# Patient Record
Sex: FEMALE | Race: WHITE | NOT HISPANIC OR LATINO | Employment: UNEMPLOYED | URBAN - METROPOLITAN AREA
[De-identification: names, ages, dates, MRNs, and addresses within clinical notes are randomized per-mention and may not be internally consistent; named-entity substitution may affect disease eponyms.]

---

## 2020-01-29 ENCOUNTER — HOSPITAL ENCOUNTER (EMERGENCY)
Facility: HOSPITAL | Age: 35
Discharge: HOME | End: 2020-01-29
Attending: EMERGENCY MEDICINE
Payer: COMMERCIAL

## 2020-01-29 VITALS
BODY MASS INDEX: 28.88 KG/M2 | HEIGHT: 67 IN | RESPIRATION RATE: 18 BRPM | DIASTOLIC BLOOD PRESSURE: 57 MMHG | SYSTOLIC BLOOD PRESSURE: 108 MMHG | OXYGEN SATURATION: 96 % | WEIGHT: 184 LBS | HEART RATE: 91 BPM | TEMPERATURE: 98 F

## 2020-01-29 DIAGNOSIS — R05.9 COUGH: ICD-10-CM

## 2020-01-29 DIAGNOSIS — N39.0 URINARY TRACT INFECTION WITHOUT HEMATURIA, SITE UNSPECIFIED: Primary | ICD-10-CM

## 2020-01-29 LAB
B-HCG UR QL: NEGATIVE
BACTERIA #/AREA URNS HPF: ABNORMAL /HPF
BILIRUB UR QL STRIP: NEGATIVE
CLARITY UR: ABNORMAL
COLOR UR: YELLOW
CTP QC/QA: YES
GLUCOSE UR QL STRIP: NEGATIVE
HGB UR QL STRIP: ABNORMAL
HYALINE CASTS #/AREA URNS LPF: 17 /LPF
INFLUENZA A, MOLECULAR: NEGATIVE
INFLUENZA B, MOLECULAR: NEGATIVE
KETONES UR QL STRIP: ABNORMAL
LEUKOCYTE ESTERASE UR QL STRIP: ABNORMAL
MICROSCOPIC COMMENT: ABNORMAL
NITRITE UR QL STRIP: NEGATIVE
PH UR STRIP: 6 [PH] (ref 5–8)
PROT UR QL STRIP: ABNORMAL
RBC #/AREA URNS HPF: 8 /HPF (ref 0–4)
SP GR UR STRIP: 1.02 (ref 1–1.03)
SPECIMEN SOURCE: NORMAL
SQUAMOUS #/AREA URNS HPF: 9 /HPF
URN SPEC COLLECT METH UR: ABNORMAL
UROBILINOGEN UR STRIP-ACNC: ABNORMAL EU/DL
WBC #/AREA URNS HPF: >100 /HPF (ref 0–5)

## 2020-01-29 PROCEDURE — 25000003 PHARM REV CODE 250: Performed by: EMERGENCY MEDICINE

## 2020-01-29 PROCEDURE — 81001 URINALYSIS AUTO W/SCOPE: CPT

## 2020-01-29 PROCEDURE — 87086 URINE CULTURE/COLONY COUNT: CPT

## 2020-01-29 PROCEDURE — 87077 CULTURE AEROBIC IDENTIFY: CPT

## 2020-01-29 PROCEDURE — 87186 SC STD MICRODIL/AGAR DIL: CPT

## 2020-01-29 PROCEDURE — 81025 URINE PREGNANCY TEST: CPT | Performed by: EMERGENCY MEDICINE

## 2020-01-29 PROCEDURE — 99284 EMERGENCY DEPT VISIT MOD MDM: CPT | Mod: 25

## 2020-01-29 PROCEDURE — 87502 INFLUENZA DNA AMP PROBE: CPT

## 2020-01-29 RX ORDER — CEPHALEXIN 250 MG/1
500 CAPSULE ORAL
Status: COMPLETED | OUTPATIENT
Start: 2020-01-29 | End: 2020-01-29

## 2020-01-29 RX ORDER — HYDROCODONE BITARTRATE AND ACETAMINOPHEN 10; 325 MG/1; MG/1
1 TABLET ORAL
COMMUNITY

## 2020-01-29 RX ORDER — MULTIVITAMIN
1 TABLET ORAL DAILY
COMMUNITY

## 2020-01-29 RX ORDER — ONDANSETRON 2 MG/ML
4 INJECTION INTRAMUSCULAR; INTRAVENOUS
Status: DISCONTINUED | OUTPATIENT
Start: 2020-01-29 | End: 2020-01-29

## 2020-01-29 RX ORDER — CYCLOBENZAPRINE HCL 10 MG
10 TABLET ORAL 3 TIMES DAILY PRN
COMMUNITY

## 2020-01-29 RX ORDER — PNV NO.95/FERROUS FUM/FOLIC AC 28MG-0.8MG
100 TABLET ORAL DAILY
COMMUNITY

## 2020-01-29 RX ORDER — CEPHALEXIN 500 MG/1
500 CAPSULE ORAL 4 TIMES DAILY
Qty: 40 CAPSULE | Refills: 0 | Status: SHIPPED | OUTPATIENT
Start: 2020-01-29 | End: 2020-02-08

## 2020-01-29 RX ORDER — OSELTAMIVIR PHOSPHATE 75 MG/1
75 CAPSULE ORAL 2 TIMES DAILY
Qty: 10 CAPSULE | Refills: 0 | Status: SHIPPED | OUTPATIENT
Start: 2020-01-29 | End: 2020-02-03

## 2020-01-29 RX ORDER — ONDANSETRON 4 MG/1
4 TABLET, ORALLY DISINTEGRATING ORAL
Status: COMPLETED | OUTPATIENT
Start: 2020-01-29 | End: 2020-01-29

## 2020-01-29 RX ADMIN — ONDANSETRON 4 MG: 4 TABLET, ORALLY DISINTEGRATING ORAL at 09:01

## 2020-01-29 RX ADMIN — CEPHALEXIN 500 MG: 250 CAPSULE ORAL at 11:01

## 2020-01-30 NOTE — ED PROVIDER NOTES
Encounter Date: 1/29/2020       History     Chief Complaint   Patient presents with    Fever     Chief complaint is flu-like symptoms with body aches sore throat mild headache fever to 102.5 had cold-like symptoms for 2 weeks.  The patient had a fever of 102.5 on Wednesday.  Last Tylenol dose 4:00 p.m..  She denies any complaints otherwise.  Vital signs here pulse 117 blood pressure 130/56 respirations 20 temperature 98.6°.        Review of patient's allergies indicates:  No Known Allergies  No past medical history on file.  No past surgical history on file.  No family history on file.  Social History     Tobacco Use    Smoking status: Not on file   Substance Use Topics    Alcohol use: Not on file    Drug use: Not on file     Review of Systems   Constitutional: Positive for fever. Negative for chills.   HENT: Positive for sore throat. Negative for ear pain and rhinorrhea.    Eyes: Negative for pain and visual disturbance.   Respiratory: Positive for cough. Negative for shortness of breath.    Cardiovascular: Negative for chest pain and palpitations.   Gastrointestinal: Negative for abdominal pain, constipation, diarrhea, nausea and vomiting.   Genitourinary: Negative for dysuria, frequency, hematuria and urgency.   Musculoskeletal: Positive for arthralgias. Negative for back pain, joint swelling and myalgias.   Skin: Negative for rash.   Neurological: Positive for headaches. Negative for dizziness, seizures and weakness.   Psychiatric/Behavioral: Negative for dysphoric mood. The patient is not nervous/anxious.        Physical Exam     Initial Vitals [01/29/20 1803]   BP Pulse Resp Temp SpO2   (!) 130/56 (!) 117 20 98.6 °F (37 °C) 96 %      MAP       --         Physical Exam    Nursing note and vitals reviewed.  Constitutional: She appears well-developed and well-nourished.   HENT:   Head: Normocephalic and atraumatic.   Eyes: Conjunctivae, EOM and lids are normal. Pupils are equal, round, and reactive to light.    Neck: Trachea normal and normal range of motion. Neck supple. No thyroid mass and no thyromegaly present.   Cardiovascular: Normal rate, regular rhythm and normal heart sounds.   Pulmonary/Chest: Effort normal and breath sounds normal.   Abdominal: Soft. Normal appearance and bowel sounds are normal. There is no tenderness.   Minimal tenderness right mid quadrant and suprapubic area no rebound negative obturator negative iliopsoas test.  Tapping heels causes no pain. Negative Dunphy test.    Musculoskeletal: Normal range of motion.   Neurological: She is alert and oriented to person, place, and time. She has normal strength and normal reflexes. No cranial nerve deficit or sensory deficit.   Skin: Skin is warm and dry.   Psychiatric: She has a normal mood and affect. Her speech is normal and behavior is normal. Judgment and thought content normal.         ED Course   Procedures  Labs Reviewed   INFLUENZA A AND B ANTIGEN    Narrative:     Specimen Source->Nasopharyngeal Swab   URINALYSIS, REFLEX TO URINE CULTURE   CBC W/ AUTO DIFFERENTIAL   COMPREHENSIVE METABOLIC PANEL   AMYLASE   LIPASE   POCT URINE PREGNANCY          Imaging Results          X-Ray Chest PA And Lateral (Final result)  Result time 01/29/20 18:24:45    Final result by Edwin Benjamin MD (01/29/20 18:24:45)                 Impression:      No acute cardiac or pulmonary process.      Electronically signed by: Edwin Benjamin MD  Date:    01/29/2020  Time:    18:24             Narrative:    CLINICAL HISTORY:  ([id number] y/o  (1985) F    Cough    TECHNIQUE:  (A#[id number], exam time 1/29/2020 18:23)    XR CHEST PA AND LATERAL [id number]    COMPARISON:  None available.    FINDINGS:  The lungs are clear. Costophrenic angles are seen without effusion. No pneumothorax is identified. The heart is normal in size. The mediastinum is within normal limits. Osseous structures appear within normal limits. The visualized upper abdomen is unremarkable.                                               Attending Attestation:             Attending ED Notes:   The patient has UTI and will be discharged home with antibiotics and instruction.  She also has flu like symptoms and will be given Tamiflu          ED Course as of Jan 29 2107 Wed Jan 29, 2020 1952 34-year-old female presents emergency room with complaint of body aches chills fever for the last few days reports she felt like she may pass out this morning and she had 1 episode of vomiting.    [MP]      ED Course User Index  [MP] RUBIO Wilson                Clinical Impression:       ICD-10-CM ICD-9-CM   1. Urinary tract infection without hematuria, site unspecified N39.0 599.0   2. Cough R05 786.2                             Priscila Brito MD  01/29/20 7414

## 2020-02-01 LAB — BACTERIA UR CULT: ABNORMAL

## 2020-02-06 ENCOUNTER — OFFICE VISIT (OUTPATIENT)
Dept: PRIMARY CARE CLINIC | Facility: CLINIC | Age: 35
End: 2020-02-06
Payer: COMMERCIAL

## 2020-02-06 VITALS
OXYGEN SATURATION: 99 % | WEIGHT: 189.06 LBS | HEIGHT: 67 IN | SYSTOLIC BLOOD PRESSURE: 116 MMHG | HEART RATE: 96 BPM | RESPIRATION RATE: 18 BRPM | TEMPERATURE: 98 F | BODY MASS INDEX: 29.67 KG/M2 | DIASTOLIC BLOOD PRESSURE: 68 MMHG

## 2020-02-06 DIAGNOSIS — Z28.21 REFUSED INFLUENZA VACCINE: ICD-10-CM

## 2020-02-06 DIAGNOSIS — Z09 HOSPITAL DISCHARGE FOLLOW-UP: Primary | ICD-10-CM

## 2020-02-06 DIAGNOSIS — Z72.0 TOBACCO ABUSE: ICD-10-CM

## 2020-02-06 PROCEDURE — 99999 PR PBB SHADOW E&M-EST. PATIENT-LVL III: CPT | Mod: PBBFAC,,, | Performed by: FAMILY MEDICINE

## 2020-02-06 PROCEDURE — 99203 PR OFFICE/OUTPT VISIT, NEW, LEVL III, 30-44 MIN: ICD-10-PCS | Mod: S$GLB,,, | Performed by: FAMILY MEDICINE

## 2020-02-06 PROCEDURE — 99203 OFFICE O/P NEW LOW 30 MIN: CPT | Mod: S$GLB,,, | Performed by: FAMILY MEDICINE

## 2020-02-06 PROCEDURE — 3008F PR BODY MASS INDEX (BMI) DOCUMENTED: ICD-10-PCS | Mod: CPTII,S$GLB,, | Performed by: FAMILY MEDICINE

## 2020-02-06 PROCEDURE — 3008F BODY MASS INDEX DOCD: CPT | Mod: CPTII,S$GLB,, | Performed by: FAMILY MEDICINE

## 2020-02-06 PROCEDURE — 99999 PR PBB SHADOW E&M-EST. PATIENT-LVL III: ICD-10-PCS | Mod: PBBFAC,,, | Performed by: FAMILY MEDICINE

## 2020-02-06 RX ORDER — VARENICLINE TARTRATE 0.5 (11)-1
KIT ORAL
Qty: 1 PACKAGE | Refills: 0 | Status: SHIPPED | OUTPATIENT
Start: 2020-02-06 | End: 2020-05-28 | Stop reason: SDUPTHER

## 2020-02-06 NOTE — PROGRESS NOTES
"Subjective:       Patient ID: Yolette Malagon is a 34 y.o. female.    Chief Complaint: Hospital Follow Up (just needs a note stating ok to return to work)    34 yr old with hx of tobacco abuse here for follow up from the ER for a UTI and flu diagnosed on 1/29/2020. Needs a note for her to return to work. She still completing the keflex for the UTI and denies fevers or chills. Already finished tamiflu.   Previously seen by Dr. Snow but has not seen him in 3 yrs and unable to see him today due to availability.   Interested in smoking cessation meds, namely chantix which helped in the past. Willing to try again.      Review of Systems   Constitutional: Negative for activity change, chills and fever.   Respiratory: Negative for cough, chest tightness, shortness of breath and wheezing.    Cardiovascular: Negative for chest pain and leg swelling.   Gastrointestinal: Negative for abdominal distention, abdominal pain, constipation, diarrhea, nausea and vomiting.   Genitourinary: Negative for dysuria.   Neurological: Negative for weakness.   Hematological: Does not bruise/bleed easily.       Objective:      Vitals:    02/06/20 1425   BP: 116/68   BP Location: Right arm   Patient Position: Sitting   BP Method: Large (Manual)   Pulse: 96   Resp: 18   Temp: 98.2 °F (36.8 °C)   TempSrc: Oral   SpO2: 99%   Weight: 85.7 kg (189 lb 0.7 oz)   Height: 5' 7" (1.702 m)     Physical Exam   Constitutional: She appears well-developed and well-nourished.   HENT:   Head: Normocephalic and atraumatic.   Nose: Nose normal.   Mouth/Throat: Oropharynx is clear and moist.   Eyes: Conjunctivae and EOM are normal.   Cardiovascular: Normal rate, regular rhythm and normal heart sounds.   Pulmonary/Chest: Effort normal and breath sounds normal. No respiratory distress. She has no wheezes. She has no rales.   Abdominal: Soft. She exhibits no distension. There is no tenderness.   Lymphadenopathy:     She has no cervical adenopathy.   Neurological: " She is alert. No cranial nerve deficit.   Psychiatric: She has a normal mood and affect.   Nursing note and vitals reviewed.          No results found for: NA, K, CL, CO2, BUN, CREATININE, GLUCOSE, ANIONGAP  No results found for: HGBA1C  No results found for: BNP, BNPTRIAGEBLO    No results found for: WBC, HGB, HCT, PLT, GRAN  No results found for: CHOL, HDL, LDLCALC, TRIG       Current Outpatient Medications:     cephALEXin (KEFLEX) 500 MG capsule, Take 1 capsule (500 mg total) by mouth 4 (four) times daily. for 10 days, Disp: 40 capsule, Rfl: 0    cyanocobalamin (VITAMIN B-12) 100 MCG tablet, Take 100 mcg by mouth once daily., Disp: , Rfl:     cyclobenzaprine (FLEXERIL) 10 MG tablet, Take 10 mg by mouth 3 (three) times daily as needed for Muscle spasms., Disp: , Rfl:     HYDROcodone-acetaminophen (NORCO)  mg per tablet, Take 1 tablet by mouth., Disp: , Rfl:     multivitamin (THERAGRAN) per tablet, Take 1 tablet by mouth once daily., Disp: , Rfl:     varenicline (CHANTIX STARTING MONTH BOX) 0.5 mg (11)- 1 mg (42) tablet, Take one 0.5mg tab by mouth once daily X3 days,then increase to one 0.5mg tab twice daily X4 days,then increase to one 1mg tab twice daily, Disp: 1 Package, Rfl: 0        Assessment:       1. Hospital discharge follow-up    2. Tobacco abuse           Plan:       Hospital discharge follow-up  Follow for UTI and flu symptoms for which she was treated on 1/29/2020 with tamiflu and keflex(still finishing script). Asymptomatic and cleared to return to work.    Tobacco abuse  -     varenicline (CHANTIX STARTING MONTH BOX) 0.5 mg (11)- 1 mg (42) tablet; Take one 0.5mg tab by mouth once daily X3 days,then increase to one 0.5mg tab twice daily X4 days,then increase to one 1mg tab twice daily  Dispense: 1 Package; Refill: 0  Offered smoking cessation counseling today-not interested in class due to time constraints. Desiring chantix which was helpful before. Prescribed today.      Maintenance: Had  annual labs done with pain doctor recently. Requesting med records. To review health maintenance needs with PCP she is desiring to follow up with soon.

## 2020-02-06 NOTE — LETTER
February 6, 2020      [facility]  [address]  [facility] [address]  Phone: [phone]  Fax: [phone]       Patient: Yolette Malagon   YOB: 1985  Date of Visit: 02/06/2020    To Whom It May Concern:    Grace Malagon  was at [facility] on 02/06/2020. She may return to work on 2/8/20 without restrictions. If you have any questions or concerns, or if I can be of further assistance, please do not hesitate to contact me.    Sincerely,    Talya Johnson LPN

## 2020-05-28 ENCOUNTER — OFFICE VISIT (OUTPATIENT)
Dept: PRIMARY CARE CLINIC | Facility: CLINIC | Age: 35
End: 2020-05-28
Payer: COMMERCIAL

## 2020-05-28 VITALS
BODY MASS INDEX: 31.73 KG/M2 | HEIGHT: 67 IN | HEART RATE: 90 BPM | OXYGEN SATURATION: 98 % | DIASTOLIC BLOOD PRESSURE: 84 MMHG | SYSTOLIC BLOOD PRESSURE: 130 MMHG | RESPIRATION RATE: 18 BRPM | WEIGHT: 202.19 LBS

## 2020-05-28 DIAGNOSIS — R51.9 INTRACTABLE EPISODIC HEADACHE, UNSPECIFIED HEADACHE TYPE: ICD-10-CM

## 2020-05-28 DIAGNOSIS — M51.36 DDD (DEGENERATIVE DISC DISEASE), LUMBAR: ICD-10-CM

## 2020-05-28 DIAGNOSIS — E66.9 CLASS 1 OBESITY WITH BODY MASS INDEX (BMI) OF 31.0 TO 31.9 IN ADULT, UNSPECIFIED OBESITY TYPE, UNSPECIFIED WHETHER SERIOUS COMORBIDITY PRESENT: ICD-10-CM

## 2020-05-28 DIAGNOSIS — Z72.0 TOBACCO ABUSE: ICD-10-CM

## 2020-05-28 PROBLEM — M51.369 DDD (DEGENERATIVE DISC DISEASE), LUMBAR: Status: ACTIVE | Noted: 2020-05-28

## 2020-05-28 PROBLEM — E66.811 CLASS 1 OBESITY WITH BODY MASS INDEX (BMI) OF 31.0 TO 31.9 IN ADULT: Status: ACTIVE | Noted: 2020-05-28

## 2020-05-28 PROCEDURE — 99213 PR OFFICE/OUTPT VISIT, EST, LEVL III, 20-29 MIN: ICD-10-PCS | Mod: S$GLB,,, | Performed by: FAMILY MEDICINE

## 2020-05-28 PROCEDURE — 3008F BODY MASS INDEX DOCD: CPT | Mod: CPTII,S$GLB,, | Performed by: FAMILY MEDICINE

## 2020-05-28 PROCEDURE — 99999 PR PBB SHADOW E&M-EST. PATIENT-LVL III: ICD-10-PCS | Mod: PBBFAC,,, | Performed by: FAMILY MEDICINE

## 2020-05-28 PROCEDURE — 99213 OFFICE O/P EST LOW 20 MIN: CPT | Mod: S$GLB,,, | Performed by: FAMILY MEDICINE

## 2020-05-28 PROCEDURE — 3008F PR BODY MASS INDEX (BMI) DOCUMENTED: ICD-10-PCS | Mod: CPTII,S$GLB,, | Performed by: FAMILY MEDICINE

## 2020-05-28 PROCEDURE — 99999 PR PBB SHADOW E&M-EST. PATIENT-LVL III: CPT | Mod: PBBFAC,,, | Performed by: FAMILY MEDICINE

## 2020-05-28 RX ORDER — VARENICLINE TARTRATE 0.5 (11)-1
KIT ORAL
Qty: 1 PACKAGE | Refills: 0 | Status: SHIPPED | OUTPATIENT
Start: 2020-05-28 | End: 2020-05-28

## 2020-05-28 RX ORDER — BUTALBITAL, ACETAMINOPHEN AND CAFFEINE 50; 325; 40 MG/1; MG/1; MG/1
TABLET ORAL
Qty: 30 TABLET | Refills: 2 | Status: SHIPPED | OUTPATIENT
Start: 2020-05-28 | End: 2020-05-28

## 2020-05-28 RX ORDER — VARENICLINE TARTRATE 0.5 (11)-1
KIT ORAL
Qty: 1 PACKAGE | Refills: 0 | Status: SHIPPED | OUTPATIENT
Start: 2020-05-28 | End: 2023-10-03 | Stop reason: SDUPTHER

## 2020-05-28 RX ORDER — BUTALBITAL, ACETAMINOPHEN AND CAFFEINE 50; 325; 40 MG/1; MG/1; MG/1
TABLET ORAL
Qty: 30 TABLET | Refills: 2 | Status: SHIPPED | OUTPATIENT
Start: 2020-05-28 | End: 2022-12-12

## 2020-05-28 NOTE — PROGRESS NOTES
Dictation #1  MRN:[id number]  CSN:[id number]  Dictation #2  MRN:[id number]  CSN:[id number]    Subjective:       Patient ID: Yolette Malagon is a 35 y.o. female.    Chief Complaint: Nicotine Dependence (wants to quit) and Headache    HPI: 34 yo WF-- patient had quit smoking in the past with Chantix-- after the corona virus started restarted smoking wants to quit again        Headache left frontal and temporal area-- another 1 in the occipital area and base of the skull-- always had headaches off and on but recently had 5 headaches out of last 7 days---quality  Sharp stabbing throbbing--severity 9/10,  Duration 1st headache lasted 3 days last 1 1/2 days .  Photophobia- sensitivity to sound.    ROS:  Skin: no psoriasis, eczema, skin cancer  HEENT: + headache, ocular pain, blurred vision, diplopia, epistaxis, hoarseness change in voice, thyroid trouble  Wears contacts had an exam less than a year ago  Lung: No pneumonia, asthma, Tb, wheezing, SOB, smoking 1 pack per day  Heart: No chest pain, ankle edema, palpitations, MI, janine murmur, hypertension, hyperlipidemia  Abdomen: + slight  Nausea, no  vomiting, diarrhea, constipation, ulcers, hepatitis, gallbladder disease, melena, hematochezia, hematemesis  : no UTI, renal disease, stones  GYN LMP  Ended 5-  MS: no fractures, O/A, lupus, rheumatoid, gout  History of DDD lumbar spine  Neuro: No dizziness, LOC, seizures   No diabetes, no anemia, + anxiety-- virus, no depression    in a relationship but not  [place] 3 children,   [place] airport,  Lives with 3 children    Objective:   Physical Exam:  General: Well nourished, well developed, no acute distress  Skin: No lesions  HEENT: Eyes PERRLA, EOM intact, nose patent, throat non-erythematous   NECK: Supple, no bruits, No JVD, no nodes  Lungs: Clear, no rales, rhonchi, wheezing  Heart: Regular rate and rhythm, no murmurs, gallops, or rubs  Abdomen: flat, bowel sounds positive, no tenderness, or organomegaly  MS: Range  of motion and muscle strength intact -- tenderness lumbar spine pain with anterior flexion extension lateral flexion rotation straight leg lift no radiculopathy reflexes  intact  Neuro: Alert, CN intact, oriented X 3 Romberg negative heel to intact  Extremities: No cyanosis, clubbing, or edema         Assessment:       1. Tobacco abuse    2. Intractable episodic headache, unspecified headache type    3. DDD (degenerative disc disease), lumbar    4. Class 1 obesity with body mass index (BMI) of 31.0 to 31.9 in adult, unspecified obesity type, unspecified whether serious comorbidity present        Plan:       Tobacco abuse  -     Discontinue: varenicline (CHANTIX STARTING MONTH BOX) 0.5 mg (11)- 1 mg (42) tablet; Take one 0.5mg tab by mouth once daily X3 days,then increase to one 0.5mg tab twice daily X4 days,then increase to one 1mg tab twice daily  Dispense: 1 Package; Refill: 0  -     varenicline (CHANTIX STARTING MONTH BOX) 0.5 mg (11)- 1 mg (42) tablet; Take one 0.5mg tab by mouth once daily X3 days,then increase to one 0.5mg tab twice daily X4 days,then increase to one 1mg tab twice daily  Dispense: 1 Package; Refill: 0    Intractable episodic headache, unspecified headache type  -     CBC auto differential; Future; Expected date: 05/28/2020  -     Comprehensive metabolic panel; Future; Expected date: 05/28/2020  -     EKG 12-lead; Future; Expected date: 05/28/2020  -     Fecal Immunochemical Test (iFOBT); Future; Expected date: 05/28/2020  -     Lipid Panel; Future; Expected date: 05/28/2020  -     POCT urine dipstick without microscope  -     T4, free; Future; Expected date: 05/28/2020  -     TSH; Future; Expected date: 05/28/2020  -     MRI Brain (Tumor with Perfusion) W W/O Contrast (XPD); Future; Expected date: 05/28/2020    DDD (degenerative disc disease), lumbar    Class 1 obesity with body mass index (BMI) of 31.0 to 31.9 in adult, unspecified obesity type, unspecified whether serious comorbidity  present    Other orders  -     Discontinue: butalbital-acetaminophen-caffeine -40 mg (FIORICET, ESGIC) -40 mg per tablet; One p.o. Q.d. P.r.n. Headache if necessary can increase to b.i.d.  Dispense: 30 tablet; Refill: 2  -     butalbital-acetaminophen-caffeine -40 mg (FIORICET, ESGIC) -40 mg per tablet; One p.o. Q.d. P.r.n. Headache if necessary can increase to b.i.d.  Dispense: 30 tablet; Refill: 2       Tobacco abuse--- Chantix tapering dose has on prescription   headaches-- with photophobia-- sensitivity to sound-- lasting for days-- Fioricet 1 p.o. Q.h.s. P.r.n. Headache needs to do a headache diary-- write down quality of headache/ severity of headache/location of headache/ frequency of headache/duration of headache /associated symptom needs MRI of the brain with and without gadolinium may need neurological consult to fail to improve   Needs routine lab CBCs CMP lipids T4 TSH stool guaiac UA EKG had chest x-ray if symptoms day may need collagen vascular workup with sed rate BARRY rheumatoid factor RPR  HIV TB skin test  Health maintenanceI HV hepatitis Clipid  tetanus pneumococcal vaccine Pap smear

## 2020-07-15 ENCOUNTER — PATIENT OUTREACH (OUTPATIENT)
Dept: ADMINISTRATIVE | Facility: HOSPITAL | Age: 35
End: 2020-07-15

## 2020-07-15 NOTE — LETTER
AUTHORIZATION FOR RELEASE OF   CONFIDENTIAL INFORMATION    Dear Dr. Gallardo,    We are seeing Yolette Malagon, date of birth 1985, in the clinic at [facility]. Nayan Snow MD is the patient's PCP. Yolette Malagon has an outstanding lab/procedure at the time we reviewed her chart. In order to help keep her health information updated, she has authorized us to request the following medical record(s):        (  )  MAMMOGRAM                                      (  )  COLONOSCOPY      ( X )  PAP SMEAR                                          (  )  OUTSIDE LAB RESULTS     (  )  DEXA SCAN                                          (  )  EYE EXAM            (  )  FOOT EXAM                                          (  )  ENTIRE RECORD     (  )  OUTSIDE IMMUNIZATIONS                 (  )  _______________         Please fax records to [facility], Bryan J Bertucci, MD, [phone]     If you have any questions, please contact Gomez Benz LPN at [phone].           Patient Name: Yolette Malagon  : 1985  Patient Phone #: [phone]

## 2020-07-16 NOTE — PROGRESS NOTES
[facility], [facility], and [facility] reviewed.  PAP from 2015 uploaded to  per [facility].  Lipid Panel uploaded to  per [facility].   Patient sees Dr. Gallardo and TERESITA sent to obtain more recent records.   updated.

## 2020-11-03 ENCOUNTER — OFFICE VISIT (OUTPATIENT)
Dept: PRIMARY CARE CLINIC | Facility: CLINIC | Age: 35
End: 2020-11-03
Payer: MEDICAID

## 2020-11-03 VITALS
HEART RATE: 91 BPM | TEMPERATURE: 98 F | DIASTOLIC BLOOD PRESSURE: 62 MMHG | BODY MASS INDEX: 33.94 KG/M2 | SYSTOLIC BLOOD PRESSURE: 116 MMHG | HEIGHT: 67 IN | WEIGHT: 216.25 LBS | RESPIRATION RATE: 18 BRPM | OXYGEN SATURATION: 99 %

## 2020-11-03 DIAGNOSIS — R11.0 NAUSEA: ICD-10-CM

## 2020-11-03 DIAGNOSIS — Z72.0 TOBACCO ABUSE: ICD-10-CM

## 2020-11-03 DIAGNOSIS — M51.36 DDD (DEGENERATIVE DISC DISEASE), LUMBAR: ICD-10-CM

## 2020-11-03 DIAGNOSIS — Z11.59 NEED FOR HEPATITIS C SCREENING TEST: ICD-10-CM

## 2020-11-03 DIAGNOSIS — R51.9 INTRACTABLE EPISODIC HEADACHE, UNSPECIFIED HEADACHE TYPE: Primary | ICD-10-CM

## 2020-11-03 DIAGNOSIS — E66.9 CLASS 1 OBESITY WITH BODY MASS INDEX (BMI) OF 31.0 TO 31.9 IN ADULT, UNSPECIFIED OBESITY TYPE, UNSPECIFIED WHETHER SERIOUS COMORBIDITY PRESENT: ICD-10-CM

## 2020-11-03 PROCEDURE — 99214 OFFICE O/P EST MOD 30 MIN: CPT | Mod: S$PBB,,, | Performed by: FAMILY MEDICINE

## 2020-11-03 PROCEDURE — 99999 PR PBB SHADOW E&M-EST. PATIENT-LVL III: ICD-10-PCS | Mod: PBBFAC,,, | Performed by: FAMILY MEDICINE

## 2020-11-03 PROCEDURE — 99999 PR PBB SHADOW E&M-EST. PATIENT-LVL III: CPT | Mod: PBBFAC,,, | Performed by: FAMILY MEDICINE

## 2020-11-03 PROCEDURE — 99214 PR OFFICE/OUTPT VISIT, EST, LEVL IV, 30-39 MIN: ICD-10-PCS | Mod: S$PBB,,, | Performed by: FAMILY MEDICINE

## 2020-11-03 PROCEDURE — 99213 OFFICE O/P EST LOW 20 MIN: CPT | Mod: PBBFAC,PN | Performed by: FAMILY MEDICINE

## 2020-11-03 RX ORDER — BUPRENORPHINE HYDROCHLORIDE 300 UG/1
FILM, SOLUBLE BUCCAL
COMMUNITY
Start: 2020-10-24

## 2020-11-03 RX ORDER — LORAZEPAM 1 MG/1
TABLET ORAL
Qty: 3 TABLET | Refills: 0 | Status: SHIPPED | OUTPATIENT
Start: 2020-11-03 | End: 2022-12-12

## 2020-11-03 NOTE — PROGRESS NOTES
Dictation #1  MRN:[id number]  CSN:[id number]  Dictation #2  MRN:[id number]  CSN:[id number]    Subjective:       Patient ID: Yolette Malagon is a 35 y.o. female.    Chief Complaint: Annual Exam    HPI: 36 yo WF-- in for annual exam-- eating well--+BM--ambulating well.  History of headaches times years---usually in the frontal area occasionally on the right parietal area---frequency 5 in the past month--duration--half to 2 days--quality pulsating throbbing--- severity 8/10--has noise and light sensitivity    ROS:  Skin: no psoriasis, eczema, skin cancer  HEENT: + headache, ocular pain, blurred vision, diplopia, epistaxis, hoarseness change in voice, thyroid trouble   Lung: No pneumonia, asthma, Tb, wheezing, SOB, smoking 1 pack per day  Heart: No chest pain, ankle edema, palpitations, MI, janine murmur, hypertension, hyperlipidemia--no stent bypass arrhythmia  Abdomen: + slight  Nausea, no  vomiting, diarrhea, constipation, ulcers, hepatitis, gallbladder disease, melena, hematochezia, hematemesis  : no UTI, renal disease, stones  GYN LMP  10/05/2020  MS: no fractures, O/A, lupus, rheumatoid, gout  History of DDD lumbar spine  Neuro: No dizziness, LOC, seizures   No diabetes, no anemia, + anxiety-- virus, no depression    in a relationship but not --- 3 children,   in airport,  Lives with 3 children    Objective:   Physical Exam:  General: Well nourished, well developed, no acute distress  Skin: No lesions  HEENT: Eyes PERRLA, EOM intact, contact nose patent, throat non-erythematous ears TMs clear  NECK: Supple, no bruits, No JVD, no nodes  Lungs: Clear, no rales, rhonchi, wheezing  Heart: Regular rate and rhythm, no murmurs, gallops, or rubs  Abdomen: flat, bowel sounds positive, no tenderness, or organomegaly  MS: Range of motion and muscle strength intact -- tenderness lumbar spine pain with anterior flexion extension lateral flexion rotation straight leg lift no radiculopathy reflexes  intact  Neuro: Alert,  CN intact, oriented X 3 Romberg negative heel to intact  Extremities: No cyanosis, clubbing, or edema         Assessment:       1. Intractable episodic headache, unspecified headache type    2. DDD (degenerative disc disease), lumbar    3. Tobacco abuse    4. Class 1 obesity with body mass index (BMI) of 31.0 to 31.9 in adult, unspecified obesity type, unspecified whether serious comorbidity present    5. Nausea    6. Need for hepatitis C screening test        Plan:       Intractable episodic headache, unspecified headache type  -     Vitamin D; Future; Expected date: 11/03/2020  -     CBC Auto Differential; Future; Expected date: 11/03/2020  -     Comprehensive Metabolic Panel; Future; Expected date: 11/03/2020  -     EKG 12-lead; Future  -     Fecal Immunochemical Test (iFOBT); Future; Expected date: 11/03/2020  -     Lipid Panel; Future; Expected date: 11/03/2020  -     POCT urine dipstick without microscope  -     X-Ray Chest PA And Lateral; Future; Expected date: 11/03/2020  -     T4, Free; Future; Expected date: 11/03/2020  -     TSH; Future; Expected date: 11/03/2020    DDD (degenerative disc disease), lumbar    Tobacco abuse    Class 1 obesity with body mass index (BMI) of 31.0 to 31.9 in adult, unspecified obesity type, unspecified whether serious comorbidity present    Nausea    Need for hepatitis C screening test  -     Hepatitis C Antibody; Future; Expected date: 11/03/2020    Other orders  -     LORazepam (ATIVAN) 1 MG tablet; One p.o. 45 min before MRI may repeat a 2nd dose if needed  Dispense: 3 tablet; Refill: 0       Tobacco abuse---was unable get Chantix last visit wants Chantix refill now to trying quit smoking  Headache--photophobia--sensitive to sound---4-5 per month last--half to 2-3 days-----pulsating throbbing--severity 8 a 10--needs MRI of the brain with without gadolinium due to claustrophobia needs Ativan  Nausea if desires can get Zofran 4 mg 1 p.o. q.6 hours p.r.n. nausea vomiting or  cramps  DDD lumbar spine--if desires can get ibuprofen 600 q.6 hours for use OTC NSAIDs  History of anxiety   Needs routine lab CBCs CMP lipids T4 TSH stool guaiac UA EKG had chest x-ray if symptoms day may need collagen vascular workup with sed rate BARRY rheumatoid factor RPR  HIV TB skin test  Health maintenanceI hepatitis C pneumococcal vaccine tetanus Pap smear flu

## 2020-11-09 ENCOUNTER — TELEPHONE (OUTPATIENT)
Dept: PRIMARY CARE CLINIC | Facility: CLINIC | Age: 35
End: 2020-11-09

## 2020-11-09 NOTE — TELEPHONE ENCOUNTER
----- Message from Shea Houston sent at 11/9/2020  1:11 PM CST -----  Contact: Patient, [phone]  Calling because [facility] can not do the MRI that Dr LONNIE Snow. Please call her. Thanks.

## 2020-11-09 NOTE — TELEPHONE ENCOUNTER
Spoke with patient notified that scheduling would contact her about rescheduling at a different location and notified that test is still pending

## 2020-11-11 ENCOUNTER — TELEPHONE (OUTPATIENT)
Dept: PRIMARY CARE CLINIC | Facility: CLINIC | Age: 35
End: 2020-11-11

## 2020-11-11 NOTE — TELEPHONE ENCOUNTER
Called patient notified denial, canceled appointment for MRI. patient states understanding.       Auth has been denied with patient's insurance. Dr. HUTSON,  can do a peer to peer to try for overturn by calling        [phone]            With patient policy number, [id number]  as  case number.   Denial Reason: This exam requires submission of additional documentation:     1. Duration of physician-directed treatment     2. Reevaluation by a clinician following completion of therapy

## 2022-12-12 ENCOUNTER — OFFICE VISIT (OUTPATIENT)
Dept: PRIMARY CARE CLINIC | Facility: CLINIC | Age: 37
End: 2022-12-12
Payer: MEDICAID

## 2022-12-12 VITALS
HEIGHT: 67 IN | BODY MASS INDEX: 25.3 KG/M2 | WEIGHT: 161.19 LBS | TEMPERATURE: 98 F | OXYGEN SATURATION: 99 % | DIASTOLIC BLOOD PRESSURE: 72 MMHG | HEART RATE: 60 BPM | SYSTOLIC BLOOD PRESSURE: 138 MMHG | RESPIRATION RATE: 18 BRPM

## 2022-12-12 DIAGNOSIS — Z11.59 NEED FOR HEPATITIS C SCREENING TEST: ICD-10-CM

## 2022-12-12 DIAGNOSIS — Z90.49 HISTORY OF CHOLECYSTECTOMY: ICD-10-CM

## 2022-12-12 DIAGNOSIS — M51.36 DDD (DEGENERATIVE DISC DISEASE), LUMBAR: Primary | ICD-10-CM

## 2022-12-12 DIAGNOSIS — Z23 NEED FOR TD VACCINE: ICD-10-CM

## 2022-12-12 DIAGNOSIS — Z72.0 TOBACCO ABUSE: ICD-10-CM

## 2022-12-12 DIAGNOSIS — Z98.51 HISTORY OF BILATERAL TUBAL LIGATION: ICD-10-CM

## 2022-12-12 PROCEDURE — 99214 PR OFFICE/OUTPT VISIT, EST, LEVL IV, 30-39 MIN: ICD-10-PCS | Mod: S$PBB,,, | Performed by: FAMILY MEDICINE

## 2022-12-12 PROCEDURE — 1159F MED LIST DOCD IN RCRD: CPT | Mod: CPTII,,, | Performed by: FAMILY MEDICINE

## 2022-12-12 PROCEDURE — 3075F PR MOST RECENT SYSTOLIC BLOOD PRESS GE 130-139MM HG: ICD-10-PCS | Mod: CPTII,,, | Performed by: FAMILY MEDICINE

## 2022-12-12 PROCEDURE — 90714 TD VACC NO PRESV 7 YRS+ IM: CPT | Mod: PBBFAC,PN

## 2022-12-12 PROCEDURE — 3075F SYST BP GE 130 - 139MM HG: CPT | Mod: CPTII,,, | Performed by: FAMILY MEDICINE

## 2022-12-12 PROCEDURE — 3008F PR BODY MASS INDEX (BMI) DOCUMENTED: ICD-10-PCS | Mod: CPTII,,, | Performed by: FAMILY MEDICINE

## 2022-12-12 PROCEDURE — 99999 PR PBB SHADOW E&M-EST. PATIENT-LVL IV: CPT | Mod: PBBFAC,,, | Performed by: FAMILY MEDICINE

## 2022-12-12 PROCEDURE — 3078F PR MOST RECENT DIASTOLIC BLOOD PRESSURE < 80 MM HG: ICD-10-PCS | Mod: CPTII,,, | Performed by: FAMILY MEDICINE

## 2022-12-12 PROCEDURE — 99214 OFFICE O/P EST MOD 30 MIN: CPT | Mod: PBBFAC,PN | Performed by: FAMILY MEDICINE

## 2022-12-12 PROCEDURE — 1159F PR MEDICATION LIST DOCUMENTED IN MEDICAL RECORD: ICD-10-PCS | Mod: CPTII,,, | Performed by: FAMILY MEDICINE

## 2022-12-12 PROCEDURE — 99999 PR PBB SHADOW E&M-EST. PATIENT-LVL IV: ICD-10-PCS | Mod: PBBFAC,,, | Performed by: FAMILY MEDICINE

## 2022-12-12 PROCEDURE — 99214 OFFICE O/P EST MOD 30 MIN: CPT | Mod: S$PBB,,, | Performed by: FAMILY MEDICINE

## 2022-12-12 PROCEDURE — 3008F BODY MASS INDEX DOCD: CPT | Mod: CPTII,,, | Performed by: FAMILY MEDICINE

## 2022-12-12 PROCEDURE — 3078F DIAST BP <80 MM HG: CPT | Mod: CPTII,,, | Performed by: FAMILY MEDICINE

## 2022-12-12 RX ORDER — NALOXONE HYDROCHLORIDE 4 MG/.1ML
SPRAY NASAL
COMMUNITY
Start: 2022-10-04

## 2022-12-12 RX ORDER — TOPIRAMATE 100 MG/1
100 TABLET, FILM COATED ORAL 2 TIMES DAILY
COMMUNITY
Start: 2022-11-14

## 2022-12-12 NOTE — PROGRESS NOTES
Subjective:       Patient ID: Yolette Malagon is a 37 y.o. female.    Chief Complaint: Annual Exam    HPI:  37-year-old white female in for annual physical exam--eating well--+BM--ambulating well    ROS:  Skin: no psoriasis, eczema, skin cancer--  HEENT: No headache, ocular pain, blurred vision, diplopia, epistaxis, hoarseness change in voice, thyroid trouble  Lung: No pneumonia, asthma, Tb, wheezing, SOB, smoking 1 1/2 ppd   Heart: No chest pain, ankle edema, palpitations, MI, janine murmur, hypertension, hyperlipidemia  Abdomen: No nausea, vomiting, diarrhea, constipation, ulcers, hepatitis,  melena, hematochezia, hematemesis history cholecystic  : no UTI, renal disease, stones  GYN LMP 11-  due soon had BTL   MS: no fractures, O/A, lupus, rheumatoid, gout  Neuro: No dizziness, LOC, seizures --history fractured skull H2  No diabetes, no anemia, no anxiety, + depression--sees therapist felt slipping into depression  Single 3 children work  lives with boyfrend 2 children one child lives with father 18 yo      Objective:   Physical Exam:  General: Well nourished, well developed, no acute distress  Skin: No lesions  HEENT: Eyes PERRLA, EOM intact, nose patent, throat non-erythematous ears TM clear   NECK: Supple, no bruits, No JVD, no nodes  Lungs: Clear, no rales, rhonchi, wheezing  Heart: Regular rate and rhythm, no murmurs, gallops, or rubs  Abdomen: flat, bowel sounds positive, no tenderness, or organomegaly  MS: Range of motion and muscle strength intact  Neuro: Alert, CN intact, oriented X 3 Romberg negative heel-toe intact  Extremities: No cyanosis, clubbing, or edema         Assessment:       1. Need for hepatitis C screening test    2. Need for Td vaccine    3. DDD (degenerative disc disease), lumbar    4. Tobacco abuse    5. History of cholecystectomy    6. History of bilateral tubal ligation          Plan:       Need for hepatitis C screening test  -     Hepatitis C Antibody; Future; Expected  date: 12/12/2022    Need for Td vaccine  -     (In Office Administered) Td Vaccine - Preservative Free    DDD (degenerative disc disease), lumbar  -     CBC Auto Differential; Future; Expected date: 12/12/2022  -     Comprehensive Metabolic Panel; Future; Expected date: 12/12/2022  -     Lipid Panel; Future; Expected date: 12/12/2022  -     TSH; Future; Expected date: 12/12/2022  -     T4, Free; Future; Expected date: 12/12/2022  -     X-Ray Chest PA And Lateral; Future; Expected date: 12/12/2022  -     EKG 12-lead; Future    Tobacco abuse    History of cholecystectomy    History of bilateral tubal ligation        Main Reason for Visit physical exam in to try and stop smoking  Tobacco abuse--1 1/2 ppd -- has done well on Chantix in the past did not do well with Nicoderm patch  Hx cholecystectomy  Hx BTL   Depression 16 yo with father--going to counselor   Pain management --herniated disc in the lower back DDD lumbar spine--arthritis on the vertebra--did have some lab work   Not sure type   Lab CBC CMP,lipid Thyroid Chest Xray EKG   Health maintenance hepatitis C COVID pneumococcal vaccine tetanus flu shot Pap smear

## 2023-04-03 ENCOUNTER — PATIENT MESSAGE (OUTPATIENT)
Dept: ADMINISTRATIVE | Facility: HOSPITAL | Age: 38
End: 2023-04-03
Payer: MEDICAID

## 2023-05-30 ENCOUNTER — PATIENT OUTREACH (OUTPATIENT)
Dept: ADMINISTRATIVE | Facility: HOSPITAL | Age: 38
End: 2023-05-30
Payer: MEDICAID

## 2023-05-30 NOTE — PROGRESS NOTES
Health Maintenance Due   Topic Date Due    Hemoglobin A1c (Diabetic Prevention Screening)  Never done    Cervical Cancer Screening  06/03/2020        Chart review done.   HM updated.   Immunizations reviewed & updated.   Care Everywhere updated.   [facility] reviewed

## 2023-09-18 ENCOUNTER — PATIENT MESSAGE (OUTPATIENT)
Dept: PRIMARY CARE CLINIC | Facility: CLINIC | Age: 38
End: 2023-09-18
Payer: MEDICAID

## 2023-10-03 ENCOUNTER — OFFICE VISIT (OUTPATIENT)
Dept: PRIMARY CARE CLINIC | Facility: CLINIC | Age: 38
End: 2023-10-03
Payer: MEDICAID

## 2023-10-03 VITALS
WEIGHT: 147.19 LBS | OXYGEN SATURATION: 98 % | HEART RATE: 72 BPM | TEMPERATURE: 98 F | SYSTOLIC BLOOD PRESSURE: 120 MMHG | RESPIRATION RATE: 18 BRPM | DIASTOLIC BLOOD PRESSURE: 84 MMHG | BODY MASS INDEX: 23.1 KG/M2 | HEIGHT: 67 IN

## 2023-10-03 DIAGNOSIS — Z13.1 SCREENING FOR DIABETES MELLITUS: Primary | ICD-10-CM

## 2023-10-03 DIAGNOSIS — G89.4 CHRONIC PAIN SYNDROME: ICD-10-CM

## 2023-10-03 DIAGNOSIS — Z90.49 HISTORY OF CHOLECYSTECTOMY: ICD-10-CM

## 2023-10-03 DIAGNOSIS — G44.211 INTRACTABLE EPISODIC TENSION-TYPE HEADACHE: ICD-10-CM

## 2023-10-03 DIAGNOSIS — Z72.0 TOBACCO ABUSE: ICD-10-CM

## 2023-10-03 DIAGNOSIS — Z98.51 HISTORY OF BILATERAL TUBAL LIGATION: ICD-10-CM

## 2023-10-03 DIAGNOSIS — M51.36 DDD (DEGENERATIVE DISC DISEASE), LUMBAR: ICD-10-CM

## 2023-10-03 PROCEDURE — 99999 PR PBB SHADOW E&M-EST. PATIENT-LVL IV: ICD-10-PCS | Mod: PBBFAC,,, | Performed by: FAMILY MEDICINE

## 2023-10-03 PROCEDURE — 3079F PR MOST RECENT DIASTOLIC BLOOD PRESSURE 80-89 MM HG: ICD-10-PCS | Mod: CPTII,,, | Performed by: FAMILY MEDICINE

## 2023-10-03 PROCEDURE — 1159F MED LIST DOCD IN RCRD: CPT | Mod: CPTII,,, | Performed by: FAMILY MEDICINE

## 2023-10-03 PROCEDURE — 3008F BODY MASS INDEX DOCD: CPT | Mod: CPTII,,, | Performed by: FAMILY MEDICINE

## 2023-10-03 PROCEDURE — 99214 PR OFFICE/OUTPT VISIT, EST, LEVL IV, 30-39 MIN: ICD-10-PCS | Mod: S$PBB,,, | Performed by: FAMILY MEDICINE

## 2023-10-03 PROCEDURE — 99214 OFFICE O/P EST MOD 30 MIN: CPT | Mod: S$PBB,,, | Performed by: FAMILY MEDICINE

## 2023-10-03 PROCEDURE — 99214 OFFICE O/P EST MOD 30 MIN: CPT | Mod: PBBFAC,PN | Performed by: FAMILY MEDICINE

## 2023-10-03 PROCEDURE — 3008F PR BODY MASS INDEX (BMI) DOCUMENTED: ICD-10-PCS | Mod: CPTII,,, | Performed by: FAMILY MEDICINE

## 2023-10-03 PROCEDURE — 3079F DIAST BP 80-89 MM HG: CPT | Mod: CPTII,,, | Performed by: FAMILY MEDICINE

## 2023-10-03 PROCEDURE — 3074F PR MOST RECENT SYSTOLIC BLOOD PRESSURE < 130 MM HG: ICD-10-PCS | Mod: CPTII,,, | Performed by: FAMILY MEDICINE

## 2023-10-03 PROCEDURE — 3074F SYST BP LT 130 MM HG: CPT | Mod: CPTII,,, | Performed by: FAMILY MEDICINE

## 2023-10-03 PROCEDURE — 99999 PR PBB SHADOW E&M-EST. PATIENT-LVL IV: CPT | Mod: PBBFAC,,, | Performed by: FAMILY MEDICINE

## 2023-10-03 PROCEDURE — 1159F PR MEDICATION LIST DOCUMENTED IN MEDICAL RECORD: ICD-10-PCS | Mod: CPTII,,, | Performed by: FAMILY MEDICINE

## 2023-10-03 RX ORDER — VARENICLINE TARTRATE 0.5 (11)-1
KIT ORAL
Qty: 1 EACH | Refills: 0 | Status: SHIPPED | OUTPATIENT
Start: 2023-10-03

## 2023-10-03 NOTE — PROGRESS NOTES
Subjective:       Patient ID: Yolette Malagon is a 38 y.o. female.    Chief Complaint: Annual Exam    HPI:  38 -year-old white female in for annual physical exam--eating well--+BM--ambulating well  Pain management Noco 10 mg TopamaxBelbuca    ROS:  Skin: no psoriasis, eczema, skin cancer--  HEENT: No headache, ocular pain, blurred vision, diplopia, epistaxis, hoarseness change in voice, thyroid trouble  Lung: No pneumonia, asthma, Tb, wheezing, SOB, smoking 1 1/2 ppd --pt to try quit smoking   Heart: No chest pain, ankle edema, palpitations, MI, janine murmur, hypertension, hyperlipidemia  Abdomen: No nausea, vomiting, diarrhea, constipation, ulcers, hepatitis,  melena, hematochezia, hematemesis history cholecystectomy  : no UTI, renal disease, stones  GYN LMP last week Sept --BTL   MS: no fractures, O/A, lupus, rheumatoid, gout lumbar DDD osteoarthritis  Neuro: No dizziness, LOC, seizures --history fractured skull age 2 iron gait fell on patient  No diabetes, no anemia, no anxiety, + depression-was seeing therapist recently stops  Single 3 children work  lives 2 children and nephew--mother was drug addict      Objective:   Physical Exam:  General: Well nourished, well developed, no acute distress  Skin: No lesions  HEENT: Eyes PERRLA, EOM intact, nose patent, throat non-erythematous ears TM clear   NECK: Supple, no bruits, No JVD, no nodes  Lungs: Clear, no rales, rhonchi, wheezing  Heart: Regular rate and rhythm, no murmurs, gallops, or rubs  Abdomen: flat, bowel sounds positive, no tenderness, or organomegaly  MS: Range of motion and muscle strength intact  Neuro: Alert, CN intact, oriented X 3 Romberg negative heel-toe intact  Extremities: No cyanosis, clubbing, or edema         Assessment:       1. Screening for diabetes mellitus    2. Tobacco abuse    3. DDD (degenerative disc disease), lumbar    4. Intractable episodic tension-type headache    5. History of bilateral tubal ligation    6. History of  cholecystectomy    7. Chronic pain syndrome          Plan:       Screening for diabetes mellitus  -     Hemoglobin A1C; Future; Expected date: 10/03/2023    Tobacco abuse  -     CBC Auto Differential; Future; Expected date: 10/03/2023  -     Comprehensive Metabolic Panel; Future; Expected date: 10/03/2023  -     Lipid Panel; Future; Expected date: 10/03/2023  -     varenicline (CHANTIX STARTING MONTH BOX) 0.5 mg (11)- 1 mg (42) tablet; Take one 0.5mg tab by mouth once daily X3 days,then increase to one 0.5mg tab twice daily X4 days,then increase to one 1mg tab twice daily  Dispense: 1 each; Refill: 0    DDD (degenerative disc disease), lumbar    Intractable episodic tension-type headache  -     CBC Auto Differential; Future; Expected date: 10/03/2023  -     Comprehensive Metabolic Panel; Future; Expected date: 10/03/2023  -     Lipid Panel; Future; Expected date: 10/03/2023    History of bilateral tubal ligation    History of cholecystectomy    Chronic pain syndrome        Main Reason for Visit physical exam in to try and stop smoking  Tobacco abuse--1 1/2 ppd -- has done well on Chantix in the past did not do well with Nicoderm patch--prescribed chantix   Hx cholecystectomy  Hx BTL   Pain management --herniated disc in the lower back DDD lumbar spine--arthritis on the vertebra--did have some lab work   Not sure type   Lab CBC CMP,lipid   Health maintenance hepatitis C COVID pneumococcal vaccine tetanus flu shot Pap smear    Answers submitted by the patient for this visit:  Review of Systems Questionnaire (Submitted on 10/2/2023)  activity change: No  unexpected weight change: No  neck pain: No  hearing loss: No  rhinorrhea: No  trouble swallowing: No  eye discharge: No  visual disturbance: No  chest tightness: No  wheezing: No  chest pain: No  palpitations: No  blood in stool: No  constipation: No  vomiting: No  diarrhea: No  polydipsia: No  polyuria: No  difficulty urinating: No  hematuria: No  menstrual problem:  No  dysuria: No  joint swelling: No  arthralgias: No  headaches: No  weakness: No  confusion: No  dysphoric mood: No

## 2023-10-18 ENCOUNTER — PATIENT MESSAGE (OUTPATIENT)
Dept: CARDIOLOGY | Facility: CLINIC | Age: 38
End: 2023-10-18
Payer: MEDICAID

## 2023-11-10 NOTE — TELEPHONE ENCOUNTER
Care Due:                  Date            Visit Type   Department     Provider  --------------------------------------------------------------------------------                                MYCHART                              FOLLOWUP/OF  [facility] [place] [facility]  Last Visit: 10-      FICE VISIT   PRIMARY CARE   Nayan Snow                               -                              [facility]  Next Visit: 04-      CARE ([facility])   PRIMARY CARE   Nayan Snow                                                            Last  Test          Frequency    Reason                     Performed    Due Date  --------------------------------------------------------------------------------    Cr..........  12 months..  varenicline..............  12- 12-    Health [facility] Embedded Care Due Messages. Reference number: [id number].   11/10/2023 2:28:31 PM CST

## 2023-11-11 RX ORDER — VARENICLINE TARTRATE 1 MG/1
TABLET, FILM COATED ORAL
Qty: 56 TABLET | Refills: 0 | Status: SHIPPED | OUTPATIENT
Start: 2023-11-11

## 2023-11-29 ENCOUNTER — PATIENT MESSAGE (OUTPATIENT)
Dept: ADMINISTRATIVE | Facility: HOSPITAL | Age: 38
End: 2023-11-29
Payer: MEDICAID

## 2024-07-08 ENCOUNTER — OFFICE VISIT (OUTPATIENT)
Dept: PRIMARY CARE CLINIC | Facility: CLINIC | Age: 39
End: 2024-07-08
Payer: COMMERCIAL

## 2024-07-08 VITALS
OXYGEN SATURATION: 92 % | RESPIRATION RATE: 18 BRPM | DIASTOLIC BLOOD PRESSURE: 70 MMHG | HEIGHT: 67 IN | HEART RATE: 80 BPM | SYSTOLIC BLOOD PRESSURE: 118 MMHG | WEIGHT: 153.88 LBS | BODY MASS INDEX: 24.15 KG/M2

## 2024-07-08 DIAGNOSIS — N64.9 BREAST LESION: Primary | ICD-10-CM

## 2024-07-08 DIAGNOSIS — Z72.0 TOBACCO ABUSE: ICD-10-CM

## 2024-07-08 DIAGNOSIS — G89.4 CHRONIC PAIN SYNDROME: ICD-10-CM

## 2024-07-08 DIAGNOSIS — Z80.3 FAMILY HISTORY OF BREAST CANCER: ICD-10-CM

## 2024-07-08 DIAGNOSIS — Z98.51 HISTORY OF BILATERAL TUBAL LIGATION: ICD-10-CM

## 2024-07-08 DIAGNOSIS — N91.2 AMENORRHEA: ICD-10-CM

## 2024-07-08 DIAGNOSIS — Z90.49 HISTORY OF CHOLECYSTECTOMY: ICD-10-CM

## 2024-07-08 LAB
B-HCG UR QL: NEGATIVE
CTP QC/QA: YES

## 2024-07-08 PROCEDURE — 3008F BODY MASS INDEX DOCD: CPT | Mod: CPTII,S$GLB,, | Performed by: FAMILY MEDICINE

## 2024-07-08 PROCEDURE — 81025 URINE PREGNANCY TEST: CPT | Mod: S$GLB,,, | Performed by: FAMILY MEDICINE

## 2024-07-08 PROCEDURE — 3078F DIAST BP <80 MM HG: CPT | Mod: CPTII,S$GLB,, | Performed by: FAMILY MEDICINE

## 2024-07-08 PROCEDURE — 3074F SYST BP LT 130 MM HG: CPT | Mod: CPTII,S$GLB,, | Performed by: FAMILY MEDICINE

## 2024-07-08 PROCEDURE — 1159F MED LIST DOCD IN RCRD: CPT | Mod: CPTII,S$GLB,, | Performed by: FAMILY MEDICINE

## 2024-07-08 PROCEDURE — 99214 OFFICE O/P EST MOD 30 MIN: CPT | Mod: S$GLB,,, | Performed by: FAMILY MEDICINE

## 2024-07-08 PROCEDURE — 99999 PR PBB SHADOW E&M-EST. PATIENT-LVL IV: CPT | Mod: PBBFAC,,, | Performed by: FAMILY MEDICINE

## 2024-07-08 RX ORDER — VARENICLINE TARTRATE 0.5 (11)-1
KIT ORAL
Qty: 1 EACH | Refills: 0 | Status: SHIPPED | OUTPATIENT
Start: 2024-07-08

## 2024-07-08 NOTE — PROGRESS NOTES
Subjective:       Patient ID: Yolette Malagon is a 39 y.o. female.    Chief Complaint: Breast Mass    HPI: 38 yo WF --complaining with right breast lump---found 3-4 weeks ago---tender at times---appears to be the same size---patient would like to get a mammogram  Still goes to pain management on Norco Topamax Belbuca---for back --has had pain since having a delivery 9 years ago had an epidural--history DDD lumbar spine.  Was 3rd delivery Sees Dr Hutchins--is considering updating MRI  LMP beginning of June--is a little late-- history BTL--usually pretty regular unless around other females  Has an aunt who had breast cancer but was told was not genetic type but very unusual type of breast cancer    ROS:  Skin: no psoriasis, eczema, skin cancer--  HEENT: No headache, ocular pain, blurred vision, diplopia, epistaxis, hoarseness change in voice, thyroid trouble  Lung: No pneumonia, asthma, Tb, wheezing, SOB, smoking 1  --pt to try quit smoking   Heart: No chest pain, ankle edema, palpitations, MI, janine murmur, hypertension, hyperlipidemia  Abdomen: No nausea, vomiting, diarrhea, constipation, ulcers, hepatitis,  melena, hematochezia, hematemesis history cholecystectomy  : no UTI, renal disease, stones  GYN LMP last week Sept --BTL   MS: no fractures, O/A, lupus, rheumatoid, gout lumbar DDD osteoarthritis  Neuro: No dizziness, LOC, seizures --history fractured skull age 2 iron gait fell on patient  No diabetes, no anemia, no anxiety, no  depression-  Single 3 children work  lives 2 children and nephew--mother was drug addict      Objective:     Physical Exam:  General: Well nourished, well developed, no acute distress  Skin: No lesions  HEENT: Eyes PERRLA, EOM intact, nose patent, throat non-erythematous ears TM clear   NECK: Supple, no bruits, No JVD, no nodes  Breasts--irregular--breast tissue--in the right upper outer quadrant approximately 3 cm--not well defined soft nontender movable--appears to be  benign  Lungs: Clear, no rales, rhonchi, wheezing  Heart: Regular rate and rhythm, no murmurs, gallops, or rubs  Abdomen: flat, bowel sounds positive, no tenderness, or organomegaly  MS: Range of motion and muscle strength intact  Neuro: Alert, CN intact, oriented X 3 Romberg negative heel-toe intact  Extremities: No cyanosis, clubbing, or edema         Assessment:       1. Breast lesion    2. Tobacco abuse    3. History of bilateral tubal ligation    4. History of cholecystectomy    5. Chronic pain syndrome    6. Family history of breast cancer    7. Amenorrhea          Plan:       Breast lesion  -     Mammo Digital Screening Bilat; Future; Expected date: 07/08/2024  -     T4, Free; Future; Expected date: 07/08/2024  -     TSH; Future; Expected date: 07/08/2024    Tobacco abuse  -     varenicline (CHANTIX STARTING MONTH BOX) 0.5 mg (11)- 1 mg (42) tablet; Take one 0.5mg tab by mouth once daily X3 days,then increase to one 0.5mg tab twice daily X4 days,then increase to one 1mg tab twice daily  Dispense: 1 each; Refill: 0    History of bilateral tubal ligation    History of cholecystectomy    Chronic pain syndrome  -     CBC Auto Differential; Future; Expected date: 07/08/2024  -     Comprehensive Metabolic Panel; Future; Expected date: 07/08/2024  -     Lipid Panel; Future; Expected date: 07/08/2024    Family history of breast cancer    Amenorrhea  -     POCT Urine Pregnancy        Main Reason for Visit   Breast lesion right breast x3 weeks--tenderness mainly around time of the periods--status post BTL but periods is late will do UTP--had a aunt who had breast cancer but was told was an unusual type that was not genetic--patient needs mammogram possible ultrasound may have fibrocystic breast disease-==-lesion proximally 3 cm slightly tender movable not well defined not hard--appears to be possible irregular patch of breast tissue  Tobacco abuse--1  -- has done well on Chantix in the past did not do well with  Nicoderm patch--prescribed chantix   Hx cholecystectomy  Hx BTL   Pain management --herniated disc in the lower back DDD lumbar spine--on Norco/Topamax/Belbuca --check medication usually do not cause gynecomastia or breast problems  Lab CBC CMP,lipid T4 TSH

## 2024-07-18 ENCOUNTER — HOSPITAL ENCOUNTER (OUTPATIENT)
Dept: RADIOLOGY | Facility: HOSPITAL | Age: 39
Discharge: HOME | End: 2024-07-18
Attending: FAMILY MEDICINE
Payer: COMMERCIAL

## 2024-07-18 DIAGNOSIS — N64.9 BREAST LESION: ICD-10-CM

## 2025-06-05 DIAGNOSIS — Z12.31 OTHER SCREENING MAMMOGRAM: ICD-10-CM

## 2025-08-12 ENCOUNTER — PATIENT MESSAGE (OUTPATIENT)
Dept: INTERNAL MEDICINE | Facility: CLINIC | Age: 40
End: 2025-08-12
Payer: COMMERCIAL